# Patient Record
Sex: FEMALE | Race: WHITE | NOT HISPANIC OR LATINO | Employment: UNEMPLOYED | URBAN - NONMETROPOLITAN AREA
[De-identification: names, ages, dates, MRNs, and addresses within clinical notes are randomized per-mention and may not be internally consistent; named-entity substitution may affect disease eponyms.]

---

## 2019-09-09 ENCOUNTER — APPOINTMENT (OUTPATIENT)
Dept: GENERAL RADIOLOGY | Facility: HOSPITAL | Age: 64
End: 2019-09-09

## 2019-09-09 ENCOUNTER — HOSPITAL ENCOUNTER (EMERGENCY)
Facility: HOSPITAL | Age: 64
Discharge: HOME | End: 2019-09-09
Attending: EMERGENCY MEDICINE | Admitting: EMERGENCY MEDICINE

## 2019-09-09 VITALS
SYSTOLIC BLOOD PRESSURE: 120 MMHG | WEIGHT: 153 LBS | HEART RATE: 64 BPM | DIASTOLIC BLOOD PRESSURE: 88 MMHG | OXYGEN SATURATION: 99 % | RESPIRATION RATE: 16 BRPM | TEMPERATURE: 98.6 F | BODY MASS INDEX: 28.89 KG/M2 | HEIGHT: 61 IN

## 2019-09-09 DIAGNOSIS — S62.102A CLOSED FRACTURE OF LEFT WRIST, INITIAL ENCOUNTER: Primary | ICD-10-CM

## 2019-09-09 PROCEDURE — 99285 EMERGENCY DEPT VISIT HI MDM: CPT

## 2019-09-09 PROCEDURE — 73110 X-RAY EXAM OF WRIST: CPT

## 2019-09-09 PROCEDURE — 73110 X-RAY EXAM OF WRIST: CPT | Performed by: RADIOLOGY

## 2019-09-09 PROCEDURE — 25010000002 PROPOFOL 10 MG/ML EMULSION: Performed by: EMERGENCY MEDICINE

## 2019-09-09 RX ORDER — HYDROCODONE BITARTRATE AND ACETAMINOPHEN 7.5; 325 MG/1; MG/1
1 TABLET ORAL ONCE
Status: COMPLETED | OUTPATIENT
Start: 2019-09-09 | End: 2019-09-09

## 2019-09-09 RX ORDER — OMEGA-3S/DHA/EPA/FISH OIL/D3 300MG-1000
400 CAPSULE ORAL DAILY
COMMUNITY

## 2019-09-09 RX ORDER — HYDROCODONE BITARTRATE AND ACETAMINOPHEN 5; 325 MG/1; MG/1
1 TABLET ORAL EVERY 8 HOURS PRN
Qty: 16 TABLET | Refills: 0 | Status: SHIPPED | OUTPATIENT
Start: 2019-09-09

## 2019-09-09 RX ORDER — PROPOFOL 10 MG/ML
100 VIAL (ML) INTRAVENOUS ONCE
Status: COMPLETED | OUTPATIENT
Start: 2019-09-09 | End: 2019-09-09

## 2019-09-09 RX ORDER — LEVOTHYROXINE SODIUM 0.03 MG/1
25 TABLET ORAL DAILY
COMMUNITY

## 2019-09-09 RX ORDER — SODIUM CHLORIDE 0.9 % (FLUSH) 0.9 %
10 SYRINGE (ML) INJECTION AS NEEDED
Status: DISCONTINUED | OUTPATIENT
Start: 2019-09-09 | End: 2019-09-10 | Stop reason: HOSPADM

## 2019-09-09 RX ADMIN — PROPOFOL 100 MG: 10 INJECTION, EMULSION INTRAVENOUS at 22:06

## 2019-09-09 RX ADMIN — HYDROCODONE BITARTRATE AND ACETAMINOPHEN 1 TABLET: 7.5; 325 TABLET ORAL at 23:00

## 2019-09-09 RX ADMIN — SODIUM CHLORIDE 1000 ML: 9 INJECTION, SOLUTION INTRAVENOUS at 22:08

## 2019-09-10 NOTE — ED NOTES
Closed reduction of left wrist complete at this time per Dr. Suarez. Sugar tong splint applied per D. Bloomberg, ED tech and Dr. Suarez. Pt tolerated well. Provider aware of Pt BP 89/78, 1L NS infusing at this time per provider's VORB.      Elizabeth Palomares, RN  09/09/19 [id number]

## 2019-09-10 NOTE — ED NOTES
Pt signed consent for conscious sedation and closed reduction of left wrist and placed on Pt chart at this time. O2 applied at 2L NC per protocol, ambu bag, suction, and all monitoring devices in place, readily usable and accessible. Pt verbalized understanding of procedure with time allotted for questions and answers. Pt positioned and prepared for procedure. Provider made aware.      Elizabeth Palomares, RN  09/09/19 4268

## 2019-09-10 NOTE — ED NOTES
Xray at bedside at this time. Peripheral neurovascular status remains in tact post splint application.      Elizabeth Palomares RN  09/09/19 5724

## 2019-09-10 NOTE — ED NOTES
Pt and Pt family are leaving with verbalized understanding to not make legal decision, drive or operate heavy machinery for 24 hours post conscious sedation.      Elizabeth Palomares RN  09/10/19 0107

## 2019-09-13 NOTE — ED PROVIDER NOTES
Subjective   Patient presents to ER with injury to left wrist        Wrist Injury   Location:  Wrist  Wrist location:  L wrist  Injury: yes    Mechanism of injury: fall        Review of Systems   Constitutional: Positive for activity change.   HENT: Negative.    Eyes: Negative.    Respiratory: Negative.    Cardiovascular: Negative.    Gastrointestinal: Negative.    Endocrine: Negative.    Genitourinary: Negative.    Musculoskeletal:        Swelling, pain left wrist   Allergic/Immunologic: Negative.    Hematological: Negative.    Psychiatric/Behavioral: Negative.        Past Medical History:   Diagnosis Date   • Disease of thyroid gland    • Hypertension        No Known Allergies    Past Surgical History:   Procedure Laterality Date   •  SECTION         History reviewed. No pertinent family history.    Social History     Socioeconomic History   • Marital status:      Spouse name: Not on file   • Number of children: Not on file   • Years of education: Not on file   • Highest education level: Not on file   Tobacco Use   • Smoking status: Never Smoker   • Smokeless tobacco: Never Used   Substance and Sexual Activity   • Alcohol use: No     Frequency: Never   • Drug use: No   • Sexual activity: Defer           Objective   Physical Exam   Constitutional: She appears well-developed and well-nourished.   HENT:   Head: Normocephalic.   Eyes: Pupils are equal, round, and reactive to light.   Neck: Normal range of motion.   Cardiovascular: Normal rate.   Pulmonary/Chest: Effort normal.   Abdominal: Soft.   Musculoskeletal:   Tender left wrist, deformity of wrist   Neurological: She is alert.   Skin: Skin is warm.   Psychiatric: She has a normal mood and affect.   Nursing note and vitals reviewed.      FX Dislocation  Date/Time: 2019 3:34 PM  Performed by: Juan Pablo Suarez MD  Authorized by: Juan Pablo Suarez MD     Consent:     Consent obtained:  Verbal and written    Consent given by:  Patient     Alternatives discussed:  No treatment, delayed treatment, alternative treatment, observation and referral  Injury:     Injury location:  Forearm    Forearm fracture type: distal radius and ulnar styloid    Pre-procedure assessment:     Neurological function: normal      Distal perfusion: normal      Range of motion: reduced    Sedation:     Sedation type:  Moderate (conscious) sedation and deep  Procedure details:     Manipulation performed: yes      Skin traction used: yes      Skeletal traction used: yes      Immobilization:  Splint    Splint type:  Sugar tong               ED Course                  MDM    Final diagnoses:   Closed fracture of left wrist, initial encounter              Juan Pablo Suarez MD  09/13/19 1531